# Patient Record
Sex: MALE | Race: BLACK OR AFRICAN AMERICAN | NOT HISPANIC OR LATINO | Employment: OTHER | URBAN - METROPOLITAN AREA
[De-identification: names, ages, dates, MRNs, and addresses within clinical notes are randomized per-mention and may not be internally consistent; named-entity substitution may affect disease eponyms.]

---

## 2017-03-06 ENCOUNTER — HOSPITAL ENCOUNTER (EMERGENCY)
Facility: HOSPITAL | Age: 47
Discharge: HOME | End: 2017-03-06
Attending: EMERGENCY MEDICINE
Payer: MEDICAID

## 2017-03-06 VITALS
BODY MASS INDEX: 20.73 KG/M2 | RESPIRATION RATE: 17 BRPM | DIASTOLIC BLOOD PRESSURE: 63 MMHG | TEMPERATURE: 98 F | SYSTOLIC BLOOD PRESSURE: 104 MMHG | HEART RATE: 111 BPM | OXYGEN SATURATION: 98 % | HEIGHT: 63 IN | WEIGHT: 117 LBS

## 2017-03-06 DIAGNOSIS — R05.9 COUGH: Primary | ICD-10-CM

## 2017-03-06 DIAGNOSIS — J40 BRONCHITIS: ICD-10-CM

## 2017-03-06 LAB
FLUAV AG SPEC QL IA: NEGATIVE
FLUBV AG SPEC QL IA: NEGATIVE
SPECIMEN SOURCE: NORMAL

## 2017-03-06 PROCEDURE — 87400 INFLUENZA A/B EACH AG IA: CPT

## 2017-03-06 PROCEDURE — 99284 EMERGENCY DEPT VISIT MOD MDM: CPT

## 2017-03-06 PROCEDURE — 25000003 PHARM REV CODE 250: Performed by: NURSE PRACTITIONER

## 2017-03-06 RX ORDER — OXCARBAZEPINE 600 MG/1
150 TABLET, FILM COATED ORAL 2 TIMES DAILY
COMMUNITY

## 2017-03-06 RX ORDER — BENZONATATE 100 MG/1
200 CAPSULE ORAL
Status: COMPLETED | OUTPATIENT
Start: 2017-03-06 | End: 2017-03-06

## 2017-03-06 RX ORDER — GUAIFENESIN/DEXTROMETHORPHAN 100-10MG/5
5 SYRUP ORAL EVERY 4 HOURS PRN
Qty: 120 ML | Refills: 0 | Status: SHIPPED | OUTPATIENT
Start: 2017-03-06 | End: 2017-03-16

## 2017-03-06 RX ORDER — AZITHROMYCIN 250 MG/1
250 TABLET, FILM COATED ORAL DAILY
Qty: 6 TABLET | Refills: 0 | Status: SHIPPED | OUTPATIENT
Start: 2017-03-06 | End: 2017-03-16

## 2017-03-06 RX ORDER — GUAIFENESIN/DEXTROMETHORPHAN 100-10MG/5
5 SYRUP ORAL EVERY 4 HOURS PRN
Qty: 120 ML | Refills: 0
Start: 2017-03-06 | End: 2017-03-06

## 2017-03-06 RX ADMIN — BENZONATATE 200 MG: 100 CAPSULE ORAL at 02:03

## 2017-03-06 NOTE — ED PROVIDER NOTES
Encounter Date: 3/6/2017    SCRIBE #1 NOTE: I, Ermelinda Mott, am scribing for, and in the presence of,  Geronimo Monique MD. I have scribed the following portions of the note - Other sections scribed: HPI/ROS.       History     Chief Complaint   Patient presents with    Cough     Pt. presents with cough that is productive for the past two days. Pt. accompanied by family member. Pt.is ambulatory. Here to r/o influenza.      Review of patient's allergies indicates:   Allergen Reactions    Divalproex     Ensure [food supplemt, lactose-reduced]      HPI Comments: CC: Cough    HPI: This 46 y.o. M who has history of autism, mental retardation, HLD, and bowel obstruction presents to the ED accompanied by a family member for evaluation of acute onset productive cough with associated congestion for 2 days. The pt denies fever, chills, N/V/D, and any other associated symptoms. No alleviating or exacerbating factors reported.     The history is provided by the patient and a relative.     Past Medical History:   Diagnosis Date    Autism     Bowel obstruction     GERD (gastroesophageal reflux disease)     Hyperlipidemia     Impulse disorder     Mental retardation      Past Surgical History:   Procedure Laterality Date    ABDOMINAL SURGERY      LIVER BIOPSY       History reviewed. No pertinent family history.  Social History   Substance Use Topics    Smoking status: Never Smoker    Smokeless tobacco: None    Alcohol use No     Review of Systems   Constitutional: Negative for chills and fever.   HENT: Positive for congestion. Negative for sore throat.    Eyes: Negative for visual disturbance.   Respiratory: Positive for cough (productive).    Cardiovascular: Negative for chest pain.   Gastrointestinal: Negative for abdominal pain, diarrhea, nausea and vomiting.   Genitourinary: Negative for dysuria and frequency.   Musculoskeletal: Negative for myalgias.   Skin: Negative for rash.   Neurological: Negative for  headaches.       Physical Exam   Initial Vitals   BP Pulse Resp Temp SpO2   03/06/17 1308 03/06/17 1311 03/06/17 1308 03/06/17 1308 03/06/17 1311   107/77 97 17 97.5 °F (36.4 °C) 98 %     Physical Exam    Vitals reviewed.  Constitutional: He appears well-developed and well-nourished.   HENT:   Head: Normocephalic and atraumatic.   Right Ear: Tympanic membrane normal.   Left Ear: Tympanic membrane normal.   Nose: Mucosal edema and rhinorrhea present.   Mouth/Throat: Oropharynx is clear and moist. No oropharyngeal exudate, posterior oropharyngeal edema, posterior oropharyngeal erythema or tonsillar abscesses.   Eyes: EOM are normal. Pupils are equal, round, and reactive to light.   Neck: Normal range of motion. Neck supple.   Cardiovascular: Normal rate, regular rhythm, normal heart sounds and intact distal pulses.   Pulmonary/Chest: Effort normal and breath sounds normal. No accessory muscle usage. No tachypnea. No respiratory distress. He has no decreased breath sounds. He has no wheezes. He has no rhonchi. He has no rales.   Abdominal: Soft. Bowel sounds are normal.   Musculoskeletal: Normal range of motion.   Neurological: He is alert and oriented to person, place, and time.   Skin: Skin is warm and dry.   Psychiatric: He has a normal mood and affect.         ED Course   Procedures  Labs Reviewed   INFLUENZA A AND B ANTIGEN          X-Rays:   Independently Interpreted Readings:   Chest X-Ray: Normal heart size.  No infiltrates.  No acute abnormalities.       Medical decision-making:    The patient received a medical screening exam. If performed, the EKG was independently evaluated by me and is pending final cardiology evaluation.  If performed, all radiographic studies were independently evaluated by me and are pending final radiology evaluation. If labs were ordered, they were reviewed. Vital signs are independently assessed by me.  If performed, the pulse oximetry was independently evaluated by me.  I decided to  obtain the patient's past medical record.  If available, I reviewed the patient's past medical record, including most recent labs and radiology reports.      The patient appears to have an upper respiratory infection/bronchitis.  Based upon the history and physical exam the patient does not appear to have a serious bacterial infection such as pneumonia, sepsis, otitis media, bacterial sinusitis, strep pharyngitis, parapharyngeal or peritonsillar abscess, meningitis.  Patient appears very well and I have given specific return precautions to the patient and/or family members.  The patient can take over the counter medications and use albuterol for any SOB. Will cover with Azithromycin for possible bacterial etiology.   The results and physical exam findings were reviewed with the patient. Pt agrees with assessment, disposition and treatment plan and has no further questions or complaints at this time.    Follow up with your primary care physician.    JOSE Monique M.D. 4:26 PM 3/6/2017                  Scribe Attestation:   Scribe #1: I performed the above scribed service and the documentation accurately describes the services I performed. I attest to the accuracy of the note.    Attending Attestation:           Physician Attestation for Scribe:  Physician Attestation Statement for Scribe #1: I, Geronimo Monique MD, reviewed documentation, as scribed by Ermelinda Mott in my presence, and it is both accurate and complete.                 ED Course     Clinical Impression:   The primary encounter diagnosis was Cough. A diagnosis of Bronchitis was also pertinent to this visit.          Geronimo Monique MD  03/06/17 4539

## 2017-03-06 NOTE — ED TRIAGE NOTES
Pt arrives to ED via personal transportation with caretaker Tarrence with c/o productive cough with clear sputum and congestion x couple days. Denies chest pain, n/v/d or any other symptoms at this time.

## 2017-03-06 NOTE — ED AVS SNAPSHOT
[facility]  [address] [place] [address]               Rian Patel   3/6/2017  2:25 PM   ED    Description:  Male : 1970   Department:  [facility]           Your Care was Coordinated By:     Provider Role From To    Geronimo Monique MD Attending Provider 17 1540 --    Sindhu Gillis NP Nurse Practitioner 17 1427 17 1536      Reason for Visit     Cough           Diagnoses this Visit        Comments    Cough    -  Primary     Bronchitis           ED Disposition     ED Disposition Condition Comment    Discharge             To Do List           Follow-up Information     Follow up with [facility].    Specialty:  Emergency Medicine    Why:  As needed, If symptoms worsen    Contact information:    [address] [id number] [phone]        Follow up with Hamilton Avila MD. Call in 1 day.    Specialty:  Family Medicine    Why:  for appointment    Contact information:    [address] [facility] [id number] [phone]          Follow up with Hamilton Avila MD In 1 week(s).    Specialty:  Family Medicine    Contact information:    [address] [facility] [address]  [phone]         These Medications        Disp Refills Start End    dextromethorphan-guaifenesin  mg/5 ml (ROBITUSSIN-DM)  mg/5 mL liquid 120 mL 0 3/6/2017 3/16/2017    Take 5 mLs by mouth every 4 (four) hours as needed. - Oral    azithromycin (ZITHROMAX Z-HENRIQUE) 250 MG tablet 6 tablet 0 3/6/2017 3/16/2017    Take 1 tablet (250 mg total) by mouth once daily. Take 500 mg initially then 250 mg daily. - Oral    albuterol sulfate 90 mcg/actuation AePB 1 each 1 3/6/2017     Inhale 1 puff into the lungs every 4 (four) hours as needed. Rescue - Inhalation      [facility] On Call Nurse Care Line -  Assistance  Registered nurses in the  [facility] provide clinical advisement, health education, appointment booking, and other advisory services.  Call for this free service at [phone].             Medications           Message regarding Medications     Verify the changes and/or additions to your medication regime listed below are the same as discussed with your clinician today.  If any of these changes or additions are incorrect, please notify your healthcare provider.        START taking these NEW medications        Refills    dextromethorphan-guaifenesin  mg/5 ml (ROBITUSSIN-DM)  mg/5 mL liquid 0    Sig: Take 5 mLs by mouth every 4 (four) hours as needed.    Class: No Print    Route: Oral    azithromycin (ZITHROMAX Z-HENRIQUE) 250 MG tablet 0    Sig: Take 1 tablet (250 mg total) by mouth once daily. Take 500 mg initially then 250 mg daily.    Class: Print    Route: Oral    albuterol sulfate 90 mcg/actuation AePB 1    Sig: Inhale 1 puff into the lungs every 4 (four) hours as needed. Rescue    Class: Print    Route: Inhalation      These medications were administered today        Dose Freq    benzonatate capsule 200 mg 200 mg ED 1 Time    Sig: Take 2 capsules (200 mg total) by mouth ED 1 Time.    Class: Normal    Route: Oral           Verify that the below list of medications is an accurate representation of the medications you are currently taking.  If none reported, the list may be blank. If incorrect, please contact your healthcare provider. Carry this list with you in case of emergency.           Current Medications     aripiprazole (ABILIFY) 10 MG Tab Take 30 mg by mouth.     oxcarbazepine (TRILEPTAL) 150 MG Tab Take 1 tablet (150 mg total) by mouth 2 (two) times daily.    oxcarbazepine (TRILEPTAL) 600 MG Tab Take 150 mg by mouth 2 (two) times daily.    quetiapine 200 mg oral tb24 (SEROQUEL XR) 200 MG Tb24 Take 1 tablet (200 mg total) by mouth once daily.    albuterol sulfate 90 mcg/actuation AePB Inhale 1 puff into the  "lungs every 4 (four) hours as needed. Rescue    atorvastatin (LIPITOR) 20 MG tablet ONE BY MOUTH DAILY    azithromycin (ZITHROMAX Z-HENRIQUE) 250 MG tablet Take 1 tablet (250 mg total) by mouth once daily. Take 500 mg initially then 250 mg daily.    dextromethorphan-guaifenesin  mg/5 ml (ROBITUSSIN-DM)  mg/5 mL liquid Take 5 mLs by mouth every 4 (four) hours as needed.    docusate sodium (COLACE) 100 MG capsule Take 1 capsule (100 mg total) by mouth 2 (two) times daily.    gabapentin (NEURONTIN) 300 MG capsule Take 150 mg by mouth 3 (three) times daily.    hydrOXYzine (ATARAX) 50 MG tablet Take 50 mg by mouth 2 (two) times daily.    mirtazapine (REMERON) 30 MG tablet Take 30 mg by mouth every evening.    PRILOSEC 20 mg capsule ONE BY MOUTH EVERY MORNING    spironolactone (ALDACTONE) 25 MG tablet Take 12.5 mg by mouth once daily.           Clinical Reference Information           Your Vitals Were     BP Pulse Temp Resp Height Weight    107/77 (BP Location: Right arm, Patient Position: Sitting) 97 97.5 °F (36.4 °C) (Oral) 17 5' 3" (1.6 m) 53.1 kg (117 lb)    SpO2 BMI             98% 20.73 kg/m2         Allergies as of 3/6/2017        Reactions    Divalproex     Ensure [Food Supplemt, Lactose-reduced]       Immunizations Administered on Date of Encounter - 3/6/2017     None      ED Micro, Lab, POCT     Start Ordered       Status Ordering Provider    03/06/17 1433 03/06/17 1433  Influenza antigen Nasal Swab  STAT      Final result       ED Imaging Orders     Start Ordered       Status Ordering Provider    03/06/17 1434 03/06/17 1433  X-Ray Chest PA And Lateral  1 time imaging      Final result         Discharge Instructions           Acute Bronchitis  Your healthcare provider has told you that you have acute bronchitis. Bronchitis is infection or inflammation of the bronchial tubes (airways in the lungs). Normally, air moves easily in and out of the airways. Bronchitis narrows the airways, making it harder for " air to flow in and out of the lungs. This causes symptoms such as shortness of breath, coughing, and wheezing. Bronchitis can be acute or chronic. Acute means the condition comes on quickly and goes away in a short time. Chronic means a condition lasts a long time and often comes back. Read on to learn more about acute bronchitis.    What causes acute bronchitis?  Acute bronchitis almost always starts as a viral respiratory infection, such as a cold or the flu. Certain factors make it more likely for a cold or flu to turn into bronchitis. These include being very young or very old or having a heart or lung problem. Cigarette smoking also makes bronchitis more likely.  When bronchitis develops, the airways become swollen. The airways may also become infected with bacteria. This is known as a secondary infection.  Diagnosing acute bronchitis  Your healthcare provider will examine you and ask about your symptoms and health history. You may also have a sputum culture to test the fluid in your lungs. Chest X-rays may be done to look for infection in the lungs.  Treating acute bronchitis  Bronchitis usually clears up as the cold or flu goes away. You can help feel better faster by doing the following:  · Take medicine as directed. You may be told to take ibuprofen or other over-the-counter medicines. These help relieve inflammation in your bronchial tubes. Your doctor may prescribe an inhaler to help open up the bronchial tubes. Most of the time, acute bronchitis is caused by a viral infection. Antibiotics are usually not prescribed for viral infections.  · Drink plenty of fluids, such as water, juice, or warm soup. Fluids loosen mucus so that you can cough it up. This helps you breathe more easily. Fluids also prevent dehydration.  · Make sure you get plenty of rest.  · Do not smoke. Do not allow anyone else to smoke in your home.  Recovery and follow-up  Follow up with your doctor as you are told. You will likely  feel better in a week or two. But a dry cough can linger beyond that time. Let your doctor know if you still have symptoms (other than a dry cough) after 2 weeks. If youre prone to getting bronchial infections, let your doctor know. And take steps to protect yourself from future infections. These steps include stopping smoking and avoiding tobacco smoke, washing your hands often, and getting a yearly flu shot.  When to call the doctor  Call the doctor if you have any of the following:  · Fever of 100.4°F (38.0°C) higher  · Symptoms that get worse, or new symptoms  · Trouble breathing  · Symptoms that dont start to improve within a week, or within 3 days of taking antibiotics   Date Last Reviewed: 8/4/2014 © 2000-2016 [employer]. [address] [place] [address], [place] [address]. All rights reserved. This information is not intended as a substitute for professional medical care. Always follow your healthcare professional's instructions.          Bronchitis, Antibiotic Treatment (Adult)    Bronchitis is an infection of the air passages (bronchial tubes) in your lungs. It often occurs when you have a cold. This illness is contagious during the first few days and is spread through the air by coughing and sneezing, or by direct contact (touching the sick person and then touching your own eyes, nose, or mouth).  Symptoms of bronchitis include cough with mucus (phlegm) and low-grade fever. Bronchitis usually lasts 7 to 14 days. Mild cases can be treated with simple home remedies. More severe infection is treated with an antibiotic.  Home care  Follow these guidelines when caring for yourself at home:  · If your symptoms are severe, rest at home for the first 2 to 3 days. When you go back to your usual activities, don't let yourself get too tired.  · Do not smoke. Also avoid being exposed to secondhand smoke.  · You may use over-the-counter medicines to control fever or pain, unless another medicine was  prescribed. (Note: If you have chronic liver or kidney disease or have ever had a stomach ulcer or gastrointestinal bleeding, talk with your healthcare provider before using these medicines. Also talk to your provider if you are taking medicine to prevent blood clots.) Aspirin should never be given to anyone younger than 18 years of age who is ill with a viral infection or fever. It may cause severe liver or brain damage.  · Your appetite may be poor, so a light diet is fine. Avoid dehydration by drinking 6 to 8 glasses of fluids per day (such as water, soft drinks, sports drinks, juices, tea, or soup). Extra fluids will help loosen secretions in the nose and lungs.  · Over-the-counter cough, cold, and sore-throat medicines will not shorten the length of the illness, but they may be helpful to reduce symptoms. (Note: Do not use decongestants if you have high blood pressure.)  · Finish all antibiotic medicine. Do this even if you are feeling better after only a few days.  Follow-up care  Follow up with your healthcare provider, or as advised. If you had an X-ray or ECG (electrocardiogram), a specialist will review it. You will be notified of any new findings that may affect your care.  Note: If you are age 65 or older, or if you have a chronic lung disease or condition that affects your immune system, or you smoke, talk to your healthcare provider about having pneumococcal vaccinations and a yearly influenza vaccination (flu shot).  When to seek medical advice  Call your healthcare provider right away if any of these occur:  · Fever of 100.4°F (38°C) or higher  · Coughing up increased amounts of colored sputum  · Weakness, drowsiness, headache, facial pain, ear pain, or a stiff neck  Call 911, or get immediate medical care  Contact emergency services right away if any of these occur.  · Coughing up blood  · Worsening weakness, drowsiness, headache, or stiff neck  · Trouble breathing, wheezing, or pain with  breathing  Date Last Reviewed: 9/13/2015  © 8773-5636 [employer], [employer]. [address] [place] [address], [place] [address]. All rights reserved. This information is not intended as a substitute for professional medical care. Always follow your healthcare professional's instructions.          [facility] Sign-Up     Activating your [facility] account is as easy as 1-2-3!     1) Visit [email or web address], select Sign Up Now, enter this activation code and your date of birth, then select Next.  [email or web address]  Expires: 4/20/2017  3:53 PM      2) Create a username and password to use when you visit [facility] in the future and select a security question in case you lose your password and select Next.    3) Enter your e-mail address and click Sign Up!    Additional Information  If you have questions, please e-mail [email or web address] or call [phone] to talk to our [facility] staff. Remember, [facility] is NOT to be used for urgent needs. For medical emergencies, dial 911.          [facility] complies with applicable Federal civil rights laws and does not discriminate on the basis of race, color, national origin, age, disability, or sex.        Language Assistance Services     ATTENTION: Language assistance services are available, free of charge. Please call [phone].      ATENCIÓN: Si habla español, tiene a jacobo disposición servicios gratuitos de asistencia lingüística. Llame al [phone].     CHÚ Ý: N?u b?n nói Ti?ng Vi?t, có các d?ch v? h? tr? ngôn ng? mi?n phí dành cho b?n. G?i s? [phone].

## 2017-03-06 NOTE — DISCHARGE INSTRUCTIONS
Acute Bronchitis  Your healthcare provider has told you that you have acute bronchitis. Bronchitis is infection or inflammation of the bronchial tubes (airways in the lungs). Normally, air moves easily in and out of the airways. Bronchitis narrows the airways, making it harder for air to flow in and out of the lungs. This causes symptoms such as shortness of breath, coughing, and wheezing. Bronchitis can be acute or chronic. Acute means the condition comes on quickly and goes away in a short time. Chronic means a condition lasts a long time and often comes back. Read on to learn more about acute bronchitis.    What causes acute bronchitis?  Acute bronchitis almost always starts as a viral respiratory infection, such as a cold or the flu. Certain factors make it more likely for a cold or flu to turn into bronchitis. These include being very young or very old or having a heart or lung problem. Cigarette smoking also makes bronchitis more likely.  When bronchitis develops, the airways become swollen. The airways may also become infected with bacteria. This is known as a secondary infection.  Diagnosing acute bronchitis  Your healthcare provider will examine you and ask about your symptoms and health history. You may also have a sputum culture to test the fluid in your lungs. Chest X-rays may be done to look for infection in the lungs.  Treating acute bronchitis  Bronchitis usually clears up as the cold or flu goes away. You can help feel better faster by doing the following:  · Take medicine as directed. You may be told to take ibuprofen or other over-the-counter medicines. These help relieve inflammation in your bronchial tubes. Your doctor may prescribe an inhaler to help open up the bronchial tubes. Most of the time, acute bronchitis is caused by a viral infection. Antibiotics are usually not prescribed for viral infections.  · Drink plenty of fluids, such as water, juice, or warm soup. Fluids loosen mucus  so that you can cough it up. This helps you breathe more easily. Fluids also prevent dehydration.  · Make sure you get plenty of rest.  · Do not smoke. Do not allow anyone else to smoke in your home.  Recovery and follow-up  Follow up with your doctor as you are told. You will likely feel better in a week or two. But a dry cough can linger beyond that time. Let your doctor know if you still have symptoms (other than a dry cough) after 2 weeks. If youre prone to getting bronchial infections, let your doctor know. And take steps to protect yourself from future infections. These steps include stopping smoking and avoiding tobacco smoke, washing your hands often, and getting a yearly flu shot.  When to call the doctor  Call the doctor if you have any of the following:  · Fever of 100.4°F (38.0°C) higher  · Symptoms that get worse, or new symptoms  · Trouble breathing  · Symptoms that dont start to improve within a week, or within 3 days of taking antibiotics   Date Last Reviewed: 8/4/2014  © 0476-1192 [employer]. [address] [place] [address]. All rights reserved. This information is not intended as a substitute for professional medical care. Always follow your healthcare professional's instructions.          Bronchitis, Antibiotic Treatment (Adult)    Bronchitis is an infection of the air passages (bronchial tubes) in your lungs. It often occurs when you have a cold. This illness is contagious during the first few days and is spread through the air by coughing and sneezing, or by direct contact (touching the sick person and then touching your own eyes, nose, or mouth).  Symptoms of bronchitis include cough with mucus (phlegm) and low-grade fever. Bronchitis usually lasts 7 to 14 days. Mild cases can be treated with simple home remedies. More severe infection is treated with an antibiotic.  Home care  Follow these guidelines when caring for yourself at home:  · If your symptoms are severe,  rest at home for the first 2 to 3 days. When you go back to your usual activities, don't let yourself get too tired.  · Do not smoke. Also avoid being exposed to secondhand smoke.  · You may use over-the-counter medicines to control fever or pain, unless another medicine was prescribed. (Note: If you have chronic liver or kidney disease or have ever had a stomach ulcer or gastrointestinal bleeding, talk with your healthcare provider before using these medicines. Also talk to your provider if you are taking medicine to prevent blood clots.) Aspirin should never be given to anyone younger than 18 years of age who is ill with a viral infection or fever. It may cause severe liver or brain damage.  · Your appetite may be poor, so a light diet is fine. Avoid dehydration by drinking 6 to 8 glasses of fluids per day (such as water, soft drinks, sports drinks, juices, tea, or soup). Extra fluids will help loosen secretions in the nose and lungs.  · Over-the-counter cough, cold, and sore-throat medicines will not shorten the length of the illness, but they may be helpful to reduce symptoms. (Note: Do not use decongestants if you have high blood pressure.)  · Finish all antibiotic medicine. Do this even if you are feeling better after only a few days.  Follow-up care  Follow up with your healthcare provider, or as advised. If you had an X-ray or ECG (electrocardiogram), a specialist will review it. You will be notified of any new findings that may affect your care.  Note: If you are age 65 or older, or if you have a chronic lung disease or condition that affects your immune system, or you smoke, talk to your healthcare provider about having pneumococcal vaccinations and a yearly influenza vaccination (flu shot).  When to seek medical advice  Call your healthcare provider right away if any of these occur:  · Fever of 100.4°F (38°C) or higher  · Coughing up increased amounts of colored sputum  · Weakness, drowsiness, headache,  facial pain, ear pain, or a stiff neck  Call 911, or get immediate medical care  Contact emergency services right away if any of these occur.  · Coughing up blood  · Worsening weakness, drowsiness, headache, or stiff neck  · Trouble breathing, wheezing, or pain with breathing  Date Last Reviewed: 9/13/2015  © 2450-4353 [employer]. [address] [place] [id number]. All rights reserved. This information is not intended as a substitute for professional medical care. Always follow your healthcare professional's instructions.

## 2018-05-25 VITALS
TEMPERATURE: 99 F | HEART RATE: 110 BPM | HEIGHT: 65 IN | BODY MASS INDEX: 28.32 KG/M2 | SYSTOLIC BLOOD PRESSURE: 139 MMHG | DIASTOLIC BLOOD PRESSURE: 84 MMHG | OXYGEN SATURATION: 97 % | WEIGHT: 170 LBS | RESPIRATION RATE: 16 BRPM

## 2018-05-25 PROCEDURE — 99283 EMERGENCY DEPT VISIT LOW MDM: CPT

## 2018-05-26 ENCOUNTER — HOSPITAL ENCOUNTER (EMERGENCY)
Facility: HOSPITAL | Age: 48
Discharge: HOME | End: 2018-05-26
Attending: EMERGENCY MEDICINE | Admitting: EMERGENCY MEDICINE
Payer: MEDICAID

## 2018-05-26 DIAGNOSIS — M79.604 RIGHT LEG PAIN: ICD-10-CM

## 2018-05-26 DIAGNOSIS — S80.811A ABRASION OF RIGHT LOWER LEG, INITIAL ENCOUNTER: Primary | ICD-10-CM

## 2018-05-26 RX ORDER — SULFAMETHOXAZOLE AND TRIMETHOPRIM 800; 160 MG/1; MG/1
1 TABLET ORAL 2 TIMES DAILY
Qty: 14 TABLET | Refills: 0 | Status: SHIPPED | OUTPATIENT
Start: 2018-05-26 | End: 2018-06-02

## 2018-05-26 RX ORDER — MUPIROCIN 20 MG/G
OINTMENT TOPICAL 2 TIMES DAILY
Qty: 30 G | Refills: 0 | Status: SHIPPED | OUTPATIENT
Start: 2018-05-26 | End: 2018-06-05

## 2018-05-26 NOTE — ED PROVIDER NOTES
Encounter Date: 5/25/2018  47-year-old male with ulcerating lesions and swelling to the posterior right leg.  No induration, drainage, or bullous lesions. History of MRkvng historian.  Alert, nontoxic, baseline mental status per home aids.  Geraldo LINTON, 11:19 p.m.  SCRIBE #1 NOTE: I, Jose Mcclain, am scribing for, and in the presence of,  SHAILA Guevara. I have scribed the following portions of the note - Other sections scribed: HPI and ROS.       History     Chief Complaint   Patient presents with    wounds right posterior leg     Care taker is with this patient from [facility] with complaints of right lower posterior leg with two quarter size wounds,and feet are edematous. Hx mental retardation,Autism,Mood disorders.Patientis yelling out loud,and bounceing up and down in the chair     CC: Wound    HPI: 47 y.o. male with history of GERD, HLD, mental retardation, and autism presents to ED accompanied by his CNA for evaluation of ulcerating lesions to the right posterior lower leg.The CNA detected the lesions yesterday. Patient initially presented to urgent care but was advised to present to the ED for antibiotics. They suspected the patient may have a possible staph infection. The patient may have been scratching the area. No fever reported. The patient appears agitated.       The history is provided by the patient. No  was used.     Review of patient's allergies indicates:   Allergen Reactions    Divalproex     Ensure [food supplemt, lactose-reduced]      Past Medical History:   Diagnosis Date    Autism     Bowel obstruction     GERD (gastroesophageal reflux disease)     Hyperlipidemia     Impulse disorder     Mental retardation      Past Surgical History:   Procedure Laterality Date    ABDOMINAL SURGERY      LIVER BIOPSY       No family history on file.  Social History   Substance Use Topics    Smoking status: Never Smoker    Smokeless tobacco: Not on file     Alcohol use No     Review of Systems   Unable to perform ROS: Other       Physical Exam     Initial Vitals [05/25/18 2325]   BP Pulse Resp Temp SpO2   139/84 110 16 98.7 °F (37.1 °C) 97 %      MAP       102.33         Physical Exam    Constitutional: He appears well-developed and well-nourished.   Eyes: Conjunctivae and EOM are normal. Pupils are equal, round, and reactive to light.   Neck: Normal range of motion. Neck supple.   Cardiovascular: Normal rate.   Pulmonary/Chest: Breath sounds normal.   Neurological: He is alert and oriented to person, place, and time.   Skin: Skin is warm.   2 small 2 cm circular lesions to right calf with minimal surrounding erythema.  No drainage noted.  Positive tenderness to palpation.  Distal pulses 3+   Psychiatric: He has a normal mood and affect.         ED Course   Procedures  Labs Reviewed   CBC W/ AUTO DIFFERENTIAL   COMPREHENSIVE METABOLIC PANEL             Medical Decision Making:   Initial Assessment:   Patient has 2 small circular abrasions to right calf with minimal surrounding erythema.  No drainage noted.  Patient is afebrile and nontoxic-appearing.  Bite is a stable.  I do not suspect cellulitis at this time.  I will discharge patient with Bactrim and Bactroban ointment.  Caretaker instructed to return patient to the emergency department if symptoms worsen.  Patient is stable for discharge.            Scribe Attestation:   Scribe #1: I performed the above scribed service and the documentation accurately describes the services I performed. I attest to the accuracy of the note.    Attending Attestation:           Physician Attestation for Scribe:  Physician Attestation Statement for Scribe #1: I, SHAILA Guevara, reviewed documentation, as scribed by Jose Mcclain in my presence, and it is both accurate and complete.                    Clinical Impression:   The primary encounter diagnosis was Abrasion of right lower leg, initial encounter. A diagnosis of Right leg  pain was also pertinent to this visit.    Disposition:   Disposition: Discharged  Condition: Stable                        SHAILA Guevara  05/26/18 0614

## 2019-03-18 ENCOUNTER — HOSPITAL ENCOUNTER (EMERGENCY)
Facility: HOSPITAL | Age: 49
Discharge: HOME | End: 2019-03-18
Attending: EMERGENCY MEDICINE
Payer: MEDICAID

## 2019-03-18 VITALS
OXYGEN SATURATION: 99 % | HEART RATE: 103 BPM | RESPIRATION RATE: 18 BRPM | DIASTOLIC BLOOD PRESSURE: 97 MMHG | SYSTOLIC BLOOD PRESSURE: 156 MMHG | TEMPERATURE: 98 F

## 2019-03-18 DIAGNOSIS — R22.0 FACIAL SWELLING: Primary | ICD-10-CM

## 2019-03-18 PROCEDURE — 99283 EMERGENCY DEPT VISIT LOW MDM: CPT

## 2019-03-18 PROCEDURE — 25000003 PHARM REV CODE 250: Performed by: PHYSICIAN ASSISTANT

## 2019-03-18 RX ORDER — PENICILLIN V POTASSIUM 250 MG/1
500 TABLET, FILM COATED ORAL
Status: COMPLETED | OUTPATIENT
Start: 2019-03-18 | End: 2019-03-18

## 2019-03-18 RX ORDER — PENICILLIN V POTASSIUM 500 MG/1
500 TABLET, FILM COATED ORAL 4 TIMES DAILY
Qty: 40 TABLET | Refills: 0 | Status: SHIPPED | OUTPATIENT
Start: 2019-03-18 | End: 2019-03-28

## 2019-03-18 RX ADMIN — PENICILLIN V POTASIUM 500 MG: 250 TABLET ORAL at 10:03

## 2019-03-18 NOTE — ED TRIAGE NOTES
Patient presents to the ED via personal transportation with caregiver. Patient's caretakers states that she noticed swelling to patient's to lower right face. Patient c/o pain to LRQ. Patient denies drainage from gums, fevers, chills.

## 2019-03-18 NOTE — DISCHARGE INSTRUCTIONS
Take all antibiotics as prescribed. Try to take with meals to limit nausea. Tylenol/Ibuprofen as needed for pain.     Follow-up with dentist. Follow-up with primary care provider. Return to this ED if facial swelling worsens, if he begins with fever, if unable to eat or drink, if unable to open jaw, if any other problems occur.

## 2019-12-23 ENCOUNTER — HOSPITAL ENCOUNTER (EMERGENCY)
Facility: HOSPITAL | Age: 49
Discharge: HOME | End: 2019-12-23
Attending: EMERGENCY MEDICINE
Payer: MEDICAID

## 2019-12-23 VITALS
SYSTOLIC BLOOD PRESSURE: 136 MMHG | RESPIRATION RATE: 22 BRPM | DIASTOLIC BLOOD PRESSURE: 90 MMHG | TEMPERATURE: 98 F | BODY MASS INDEX: 27.32 KG/M2 | WEIGHT: 170 LBS | HEART RATE: 87 BPM | HEIGHT: 66 IN | OXYGEN SATURATION: 99 %

## 2019-12-23 DIAGNOSIS — R11.10 VOMITING: ICD-10-CM

## 2019-12-23 DIAGNOSIS — I10 HYPERTENSION, UNSPECIFIED TYPE: Primary | ICD-10-CM

## 2019-12-23 LAB
ALBUMIN SERPL BCP-MCNC: 4.5 G/DL (ref 3.5–5.2)
ALP SERPL-CCNC: 107 U/L (ref 55–135)
ALT SERPL W/O P-5'-P-CCNC: 18 U/L (ref 10–44)
ANION GAP SERPL CALC-SCNC: 9 MMOL/L (ref 8–16)
AST SERPL-CCNC: 23 U/L (ref 10–40)
BASOPHILS # BLD AUTO: 0.05 K/UL (ref 0–0.2)
BASOPHILS NFR BLD: 1 % (ref 0–1.9)
BILIRUB SERPL-MCNC: 0.5 MG/DL (ref 0.1–1)
BUN SERPL-MCNC: 7 MG/DL (ref 6–20)
CALCIUM SERPL-MCNC: 10 MG/DL (ref 8.7–10.5)
CHLORIDE SERPL-SCNC: 103 MMOL/L (ref 95–110)
CO2 SERPL-SCNC: 29 MMOL/L (ref 23–29)
CREAT SERPL-MCNC: 1.3 MG/DL (ref 0.5–1.4)
DIFFERENTIAL METHOD: NORMAL
EOSINOPHIL # BLD AUTO: 0.2 K/UL (ref 0–0.5)
EOSINOPHIL NFR BLD: 3 % (ref 0–8)
ERYTHROCYTE [DISTWIDTH] IN BLOOD BY AUTOMATED COUNT: 12.8 % (ref 11.5–14.5)
EST. GFR  (AFRICAN AMERICAN): >60 ML/MIN/1.73 M^2
EST. GFR  (NON AFRICAN AMERICAN): >60 ML/MIN/1.73 M^2
GLUCOSE SERPL-MCNC: 98 MG/DL (ref 70–110)
HCT VFR BLD AUTO: 46.8 % (ref 40–54)
HGB BLD-MCNC: 15.3 G/DL (ref 14–18)
IMM GRANULOCYTES # BLD AUTO: 0.01 K/UL (ref 0–0.04)
IMM GRANULOCYTES NFR BLD AUTO: 0.2 % (ref 0–0.5)
LYMPHOCYTES # BLD AUTO: 1.7 K/UL (ref 1–4.8)
LYMPHOCYTES NFR BLD: 32.1 % (ref 18–48)
MCH RBC QN AUTO: 28.8 PG (ref 27–31)
MCHC RBC AUTO-ENTMCNC: 32.7 G/DL (ref 32–36)
MCV RBC AUTO: 88 FL (ref 82–98)
MONOCYTES # BLD AUTO: 0.6 K/UL (ref 0.3–1)
MONOCYTES NFR BLD: 10.6 % (ref 4–15)
NEUTROPHILS # BLD AUTO: 2.8 K/UL (ref 1.8–7.7)
NEUTROPHILS NFR BLD: 53.1 % (ref 38–73)
NRBC BLD-RTO: 0 /100 WBC
PLATELET # BLD AUTO: 275 K/UL (ref 150–350)
PMV BLD AUTO: 10.4 FL (ref 9.2–12.9)
POTASSIUM SERPL-SCNC: 3.7 MMOL/L (ref 3.5–5.1)
PROT SERPL-MCNC: 8.1 G/DL (ref 6–8.4)
RBC # BLD AUTO: 5.31 M/UL (ref 4.6–6.2)
SODIUM SERPL-SCNC: 141 MMOL/L (ref 136–145)
WBC # BLD AUTO: 5.26 K/UL (ref 3.9–12.7)

## 2019-12-23 PROCEDURE — 99284 EMERGENCY DEPT VISIT MOD MDM: CPT | Mod: 25

## 2019-12-23 PROCEDURE — 85025 COMPLETE CBC W/AUTO DIFF WBC: CPT

## 2019-12-23 PROCEDURE — 80053 COMPREHEN METABOLIC PANEL: CPT

## 2019-12-23 PROCEDURE — 36000 PLACE NEEDLE IN VEIN: CPT

## 2019-12-23 RX ORDER — ONDANSETRON 4 MG/1
4 TABLET, ORALLY DISINTEGRATING ORAL EVERY 8 HOURS PRN
Qty: 20 TABLET | Refills: 0 | Status: SHIPPED | OUTPATIENT
Start: 2019-12-23

## 2019-12-23 NOTE — ED PROVIDER NOTES
Encounter Date: 12/23/2019    SCRIBE #1 NOTE: I, Nathan Ba, am scribing for, and in the presence of,  Brenton Mandel MD. I have scribed the following portions of the note - Other sections scribed: HPI/ROS/PE.       History     Chief Complaint   Patient presents with    Hypertension     pt lives home alone with around the clock nursing care. nurses stated pts BP today was 170/120, and was given a clonidine. no history of HTN, pt has no complaints at this time. daughter also states that pt threw up 3x last night    Emesis     This 48 y.o. male with a medical history of Autism and mental retardation presents to the ED accompanied by caregiver for an emergent evaluation of elevated BP. Caregiver reports pt had a PCP appointment today where it was noted that BP was elevated after it was taken several times. Pt was given Clonidine. After he relaxed and calmed down, BP was still elevated. The source of the elevated BP was unknown which is why pt was sent to the ED for a further evaluation. Additionally, caregiver reports when she arrived to work today, she was told by the overnight staff that pt vomited 3x and had a few BMs. The staff member assumed that pt was nauseous. Caregiver is unsure if he had diarrhea. Of note, caregiver reports a mild cough. Caregiver states that pt was asymptomatic last week. Otherwise, no fever, chills, rash, and any other associated symptoms.     The history is provided by a caregiver. No  was used.     Review of patient's allergies indicates:   Allergen Reactions    Divalproex     Ensure [food supplemt, lactose-reduced]      Past Medical History:   Diagnosis Date    Autism     Bowel obstruction     GERD (gastroesophageal reflux disease)     Hyperlipidemia     Impulse disorder     Mental retardation      Past Surgical History:   Procedure Laterality Date    ABDOMINAL SURGERY      LIVER BIOPSY       History reviewed. No pertinent family  history.  Social History     Tobacco Use    Smoking status: Never Smoker    Smokeless tobacco: Never Used   Substance Use Topics    Alcohol use: No    Drug use: No     Review of Systems   Constitutional: Negative.    HENT: Negative.    Eyes: Negative.    Respiratory: Positive for cough.    Cardiovascular: Negative.    Gastrointestinal: Positive for vomiting.   Genitourinary: Negative.    Musculoskeletal: Negative.    Skin: Negative.    Neurological: Negative.        Physical Exam     Initial Vitals [12/23/19 1305]   BP Pulse Resp Temp SpO2   (!) 136/90 87 (!) 22 97.9 °F (36.6 °C) 99 %      MAP       --         Physical Exam    Nursing note and vitals reviewed.  Constitutional: He appears well-developed and well-nourished. He is not diaphoretic. No distress.   HENT:   Head: Atraumatic.   Eyes: EOM are normal.   Opacified cornea of the L eye. 3mm R pupil.    Neck: Normal range of motion. Neck supple.   Cardiovascular: Normal rate and regular rhythm.   Pulmonary/Chest: Breath sounds normal. No respiratory distress.   Abdominal: Soft. Bowel sounds are normal.   Musculoskeletal: Normal range of motion. He exhibits no edema or tenderness.   Neurological: He is alert.   Skin: Skin is warm and dry.   Psychiatric: He has a normal mood and affect.         ED Course   Procedures  Labs Reviewed   CBC W/ AUTO DIFFERENTIAL   COMPREHENSIVE METABOLIC PANEL          Imaging Results          X-Ray Chest AP Portable (Final result)  Result time 12/23/19 14:00:36    Final result by Aaron Johnson MD (12/23/19 14:00:36)                 Impression:      No acute cardiopulmonary disease      Electronically signed by: Aaron Johnson MD  Date:    12/23/2019  Time:    14:00             Narrative:    EXAMINATION:  XR CHEST AP PORTABLE    CLINICAL HISTORY:  Vomiting, unspecified    TECHNIQUE:  Single frontal view of the chest was performed.    COMPARISON:  03/06/2017    FINDINGS:  Heart size and pulmonary vessels are normal.  Lungs  are expanded and clear.  No pleural fluid.  Skeletal structures are intact; old rib fracture is seen on the right.  No free air is seen in the upper abdomen.                                            Scribe Attestation:   Scribe #1: I performed the above scribed service and the documentation accurately describes the services I performed. I attest to the accuracy of the note.      MDM:    48 y.o.male with PMHx as noted above, presents with hypertension and vomiting x 1 day. Physical exam remarkable for well appearing male, conversing with ease, abdomen soft, nt/nd, CTAB, no peripherla edema noted.  ED workup remarkable for CBC CMP unremarkable, chest x-ray unremarkable.  Pt presentation consistent with asymptomatic HTN, resolving pta.  At this time given patient's history, physical exam, and ED workup do not suspect CVA/TIA, ICH, HTN emergency, ARF/JERAMY, intoxication, MI/ACS, aortic dissection, or any further malignant cause.  Discussed diagnosis and further treatment with patient and caregiver, including continued BP recordings and BP diary for further intervention with PCP. Return precautions given and all questions answered.  Patient and caregiver in understanding of plan.  Pt discharged to home improved and stable.                    Scribe Attestation: I, Brenton Mandel M.D., personally performed the services described in this documentation. All medical record entries made by the scribe were at my direction and in my presence. I have reviewed the chart and agree that the record reflects my personal performance and is accurate and complete.  Clinical Impression:       ICD-10-CM ICD-9-CM   1. Hypertension, unspecified type I10 401.9   2. Vomiting R11.10 787.03                             Brenton Mandel MD  12/23/19 2156

## 2022-08-26 ENCOUNTER — HOSPITAL ENCOUNTER (EMERGENCY)
Facility: HOSPITAL | Age: 52
Discharge: HOME | End: 2022-08-26
Attending: EMERGENCY MEDICINE
Payer: MEDICAID

## 2022-08-26 VITALS
WEIGHT: 170 LBS | HEART RATE: 113 BPM | OXYGEN SATURATION: 97 % | HEIGHT: 66 IN | TEMPERATURE: 98 F | DIASTOLIC BLOOD PRESSURE: 76 MMHG | RESPIRATION RATE: 18 BRPM | SYSTOLIC BLOOD PRESSURE: 108 MMHG | BODY MASS INDEX: 27.32 KG/M2

## 2022-08-26 DIAGNOSIS — F84.0 AUTISM SPECTRUM DISORDER REQUIRING VERY SUBSTANTIAL SUPPORT (LEVEL 3): Primary | ICD-10-CM

## 2022-08-26 DIAGNOSIS — T14.8XXA ABRASION: ICD-10-CM

## 2022-08-26 PROCEDURE — 25000003 PHARM REV CODE 250: Performed by: EMERGENCY MEDICINE

## 2022-08-26 PROCEDURE — 99283 EMERGENCY DEPT VISIT LOW MDM: CPT

## 2022-08-26 RX ORDER — LEVOTHYROXINE SODIUM 100 UG/1
100 TABLET ORAL
COMMUNITY

## 2022-08-26 RX ORDER — PRAVASTATIN SODIUM 20 MG/1
20 TABLET ORAL DAILY
COMMUNITY

## 2022-08-26 RX ORDER — HALOPERIDOL 5 MG/1
10 TABLET ORAL
Status: COMPLETED | OUTPATIENT
Start: 2022-08-26 | End: 2022-08-26

## 2022-08-26 RX ORDER — HALOPERIDOL 1 MG/1
1 TABLET ORAL 4 TIMES DAILY
COMMUNITY

## 2022-08-26 RX ADMIN — HALOPERIDOL 10 MG: 5 TABLET ORAL at 11:08

## 2022-08-26 NOTE — DISCHARGE INSTRUCTIONS
https://www.autism.org/challenging-behaviors-in-adults-with-autism/      Aggression, destructiveness, and self-injury are common among individuals with ASD. The contributors to aggression, destructiveness, and self-injury can be classified into three categories: a biological basis, a social basis, and an initial biological cause that is later maintained by social consequences. In other instances, nutritional, sensory, or behavioral interventions can be very helpful. Countless individuals on the autism spectrum and their families worldwide are dealing with challenging behaviors. Understanding the causes and pathophysiology of these behaviors and developing effective treatments  is a priority.

## 2022-08-26 NOTE — ED NOTES
Notified [facility] that pt is ready for discharge. They stated they will call caregiver to come get him.

## 2022-08-26 NOTE — ED TRIAGE NOTES
Pt to ED with [facility] and caregiver. [facility] reports pt was at a clinic when he began biting and hitting himself. MD at clinic felt pt was a harm to himself. Caregiver reports this is normal behavior for pt when he has to do something he doesn't want to do. Pt has a hx of MR and autism. Pt easily agitated. Pt consistently asking for food.

## 2022-08-30 NOTE — ED PROVIDER NOTES
Encounter Date: 8/26/2022    ------------------------------------------------------------------------------------------------------------------  I, Harriet Lee, have reviewed the SORT/triage note.      History     Chief Complaint   Patient presents with    Psychiatric Evaluation     Pt to ED via [facility] needing psychiatric evaluation. Caretaker at bedside reports pt is autistic and started biting himself and hitting himself. Pt seems distraught in triage. Denies SI/HI.       HPI: 51 y.o. male PMHx Developmental delay, autism presents with biting himself while he was at his PCP office for evaluation.  PCP wanted patient to be evaluated because he was hitting himself as well.  Caregiver states this an unusual behavior for patient when he is asked to do something he does not want to do. There is no bleeding wound noted.  Patient's only complaint is that he is hungry.   There is no report of LOC, n/v/d/c, f/c, cp, sob, weakness, slurred speech or eye problems.    Rest of history limited by patient desire to have food    Past Medical History:   Diagnosis Date    Autism     Bowel obstruction     GERD (gastroesophageal reflux disease)     Hyperlipidemia     Impulse disorder     Mental retardation      Past Surgical History:   Procedure Laterality Date    ABDOMINAL SURGERY      LIVER BIOPSY       Social History     Tobacco Use    Smoking status: Never    Smokeless tobacco: Never   Substance Use Topics    Alcohol use: No    Drug use: No     No family history on file.  Review of patient's allergies indicates:   Allergen Reactions    Divalproex     Ensure [food supplemt, lactose-reduced]        Current Outpatient Medications:     divalproex sodium (DIVALPROEX ORAL), Take by mouth., Disp: , Rfl:     haloperidoL (HALDOL) 1 MG tablet, Take 1 mg by mouth 4 (four) times daily., Disp: , Rfl:     levothyroxine (SYNTHROID) 100 MCG tablet, Take 100 mcg by mouth before breakfast., Disp: , Rfl:     oxcarbazepine (TRILEPTAL) 600 MG  Tab, Take 150 mg by mouth 2 (two) times daily., Disp: , Rfl:     pravastatin (PRAVACHOL) 20 MG tablet, Take 20 mg by mouth once daily., Disp: , Rfl:     quetiapine 200 mg oral tb24 (SEROQUEL XR) 200 MG Tb24, Take 1 tablet (200 mg total) by mouth once daily. (Patient taking differently: Take 400 mg by mouth once daily.), Disp: 30 tablet, Rfl: 11    albuterol sulfate 90 mcg/actuation AePB, Inhale 1 puff into the lungs every 4 (four) hours as needed. Rescue, Disp: 1 each, Rfl: 1    aripiprazole (ABILIFY) 10 MG Tab, Take 30 mg by mouth. , Disp: , Rfl:     atorvastatin (LIPITOR) 20 MG tablet, ONE BY MOUTH DAILY, Disp: 30 tablet, Rfl: 0    docusate sodium (COLACE) 100 MG capsule, Take 1 capsule (100 mg total) by mouth 2 (two) times daily., Disp: 60 capsule, Rfl: 0    gabapentin (NEURONTIN) 300 MG capsule, Take 150 mg by mouth 3 (three) times daily., Disp: , Rfl:     hydrOXYzine (ATARAX) 50 MG tablet, Take 50 mg by mouth 2 (two) times daily., Disp: , Rfl:     mirtazapine (REMERON) 30 MG tablet, Take 30 mg by mouth every evening., Disp: , Rfl:     ondansetron (ZOFRAN-ODT) 4 MG TbDL, Take 1 tablet (4 mg total) by mouth every 8 (eight) hours as needed., Disp: 20 tablet, Rfl: 0    PRILOSEC 20 mg capsule, ONE BY MOUTH EVERY MORNING, Disp: 30 capsule, Rfl: 2    spironolactone (ALDACTONE) 25 MG tablet, Take 12.5 mg by mouth once daily., Disp: , Rfl:     Review of Systems as per HPI  Review of Systems   Unable to perform ROS: Other (Autism with Developmental Delay)   Constitutional:  Negative for fever.   HENT:  Negative for congestion.    Respiratory:  Negative for cough.    Gastrointestinal:  Negative for abdominal pain, diarrhea, nausea and vomiting.   Genitourinary: Negative.    Skin:  Negative for itching and rash.   Neurological:  Negative for dizziness, tingling, tremors, sensory change, speech change, focal weakness, seizures, loss of consciousness, weakness and headaches.   Endo/Heme/Allergies: Negative.        Physical  "Exam     ED Triage Vitals [08/26/22 1114]   Enc Vitals Group      /79      Pulse (!) 119      Resp 20      Temp 97.4 °F (36.3 °C)      Temp src Oral      SpO2 99 %      Weight 170 lb      Height 5' 6"      Head Circumference       Peak Flow       Pain Score       Pain Loc       Pain Edu?       Excl. in GC?      PHYSICAL EXAMINATION:  Vitals and nursing note reviewed. Tachycardia, afebrile  GENERAL: NAD, alert, well-developed, well-nourished  Physical Exam  Neurological:      General: No focal deficit present.      Mental Status: He is alert and oriented to person, place, and time.      GCS: GCS eye subscore is 4. GCS verbal subscore is 5. GCS motor subscore is 6.      Cranial Nerves: Cranial nerves 2-12 are intact.      Sensory: Sensation is intact.      Motor: Motor function is intact. No weakness, tremor, atrophy, abnormal muscle tone or seizure activity.      Gait: Gait is intact.   Psychiatric:         Attention and Perception: He is inattentive. He does not perceive auditory or visual hallucinations.         Mood and Affect: Mood is not anxious or depressed. Affect is not blunt, angry or tearful.         Behavior: Behavior is hyperactive. Behavior is not agitated, slowed, aggressive, withdrawn or combative. Behavior is cooperative.         Thought Content: Thought content is not paranoid or delusional.       HEENT: Head is normocephalic and atraumatic. Extraocular muscles are intact. Pupils are equal, round, and reactive to light and accommodation. Nares appeared normal. Mouth is without lesions. Mucous membranes are moist. no nystagmus  LUNGS: equal and bilateral chest rise, no obvious wheezing,  no respiratory distress  CV:  no obvious JVD  ABDOMEN:  nondistended.  No guarding  BACK: FROM, neg SLR  EXTREMITIES: MONTENEGRO x 4, FROM, no obvious swelling, no pedal edema  SKIN: warm, dry, intact, no rash/lesions, no pallor      Tests     Labs Reviewed - No data to display  No orders to display       PROCEDURE(S): "  NONE      MEDICAL DECISION MAKING:  History supplemented by old records which were checked/reviewed in EMR: Patient evaluated for epilepsy July 2022.    51 y.o. male PMHx Developmental delay, Autism, Epilepsy presents with increased agitation when arriving at clinic for evaluation.    Consider of conditions include:    Aggression, destructiveness, and self-injury are common among individuals with ASD. The contributors to aggression, destructiveness, and self-injury can be classified into three categories: a biological basis, a social basis, and an initial biological cause that is later maintained by social consequences. In other instances, nutritional, sensory, or behavioral interventions can be very helpful. Countless individuals on the autism spectrum and their families worldwide are dealing with challenging behaviors. Understanding the causes and pathophysiology of these behaviors and developing effective treatments  is a priority.    Unlikely substance abuse, metabolic disturbances,  poisoning, medication side effect, UTI,  dehydration, hyperammonemia, ICH, closed head injury.      Treatment plan includes history, physical exam, cardiac monitoring,  and supportive care.          ED Course   REASSESSMENT: Attempted to position patient in close proximity to the nurses station and leave the door open, attendant to stay with patient as much as possible, speaking calm/quiet voice, active listening, attention to feelings and concerns, redirection/re-orientation, one-on-one verbal communication, decrease stimuli, provide the diversion, review medications, frequent observation and position changes for comfort, and relaxation techniques.  Above alternatives attempted prior to administering medication and restraint.    The results of physical exam findings were reviewed with the patient's caregiver. This discussion included but not exclusive to the risk to the patient due to the potential underlying pathology, the  testing that was required to make the diagnosis, and the treatment administered or prescribed.  Patient given sandwich and immediately becomes more calm. No biting or hitting noted while being observed.    MDM  Reviewed: previous chart, nursing note and vitals    ED Course as of 08/31/22 1034   Fri Aug 26, 2022   1220 RN notified [facility] that pt is ready for discharge. They stated they will call caregiver to come get him. [NO]      ED Course User Index  [NO] Harriet Lee MD       REASSESSMENT: Patient is tolerating p.o. and ambulating with steady gait.   Sx improved after treatment with:   Medications   haloperidoL tablet 10 mg (10 mg Oral Given 8/26/22 1148)        Pt's caregiver agrees with assessment, disposition and treatment plan.  She is amenable to discharge. Precautions for return discussed at length. Further work up and treatment options offered to caregiver who declined. Pt's caregiver informed and understands should immediately return to emergency department with persistent or worsening symptoms or any other concerns.  Discharge and follow-up instructions discussed with the Pt's caregiver who expressed understanding.  A printed After Visit Summary, relating to diagnosis, concerns, and/or associated differentials, was given to the Pt's caregiver. All questions asked and answered to the satisfaction of the Pt's caregiver    Clinical Impression:   Final diagnoses:  [F84.0] Autism spectrum disorder requiring very substantial support (level 3) (Primary)  [T14.8XXA] Abrasion        ED Disposition Condition    Discharge Stable          ED Prescriptions    None       Follow-up Information       Follow up With Specialties Details Why Contact Info    Hamilton Avila MD Family Medicine Call in 2 days As needed, If symptoms worsen [address] [facility] [id number] [phone]               Harriet Lee MD  08/31/22 1883

## 2023-10-31 ENCOUNTER — HOSPITAL ENCOUNTER (EMERGENCY)
Facility: OTHER | Age: 53
Discharge: HOME | End: 2023-10-31
Attending: EMERGENCY MEDICINE
Payer: MEDICAID

## 2023-10-31 VITALS
HEART RATE: 85 BPM | HEIGHT: 63 IN | BODY MASS INDEX: 20.73 KG/M2 | OXYGEN SATURATION: 98 % | SYSTOLIC BLOOD PRESSURE: 129 MMHG | RESPIRATION RATE: 16 BRPM | WEIGHT: 117 LBS | DIASTOLIC BLOOD PRESSURE: 92 MMHG
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
## 2023-10-31 DIAGNOSIS — S09.90XA CLOSED HEAD INJURY, INITIAL ENCOUNTER: Primary | ICD-10-CM

## 2023-10-31 PROCEDURE — 99283 EMERGENCY DEPT VISIT LOW MDM: CPT

## 2023-10-31 RX ORDER — LIDOCAINE HYDROCHLORIDE 10 MG/ML
10 INJECTION INFILTRATION; PERINEURAL
Status: DISCONTINUED | OUTPATIENT
Start: 2023-10-31 | End: 2023-10-31 | Stop reason: HOSPADM

## 2023-10-31 NOTE — ED PROVIDER NOTES
"SCRIBE #1 NOTE: I, Sina Campa, am scribing for, and in the presence of,  Matti Lang DO. I have scribed the following portions of the note - Other sections scribed: HPI, PE.         Source of History:  Patient's caretaker    Chief complaint:  Fall (Witnessed fall earlier this morning after losing balance. -LOC, -blood thinners. Pt lives in assisted living facility with 24/7 total care.  Small knot noted to occiput with small avulsion, no active bleeding. Per caretakers, pt is mentally at baseline. )      HPI:  Rian Patel is a 52 y.o. male presenting after a witnessed ground-level fall in his apartment that occurred about 6 hours ago. He is in an assisted living community with 24/7 total care. The caretaker reports that he slipped and fell, striking his head. He did not lose consciousness and they deny any nausea or vomiting after the event. They are unsure of his last tetanus shot.    Patient denies any other complaints at this time.    This is the extent to the patients complaints today here in the emergency department.    ROS:   See HPI.    Review of patient's allergies indicates:   Allergen Reactions    Divalproex     Ensure [food supplemt, lactose-reduced]        PMH:  As per HPI and below:  Past Medical History:   Diagnosis Date    Autism     Bowel obstruction     GERD (gastroesophageal reflux disease)     Hyperlipidemia     Impulse disorder     Mental retardation      Past Surgical History:   Procedure Laterality Date    ABDOMINAL SURGERY      LIVER BIOPSY         Social History     Tobacco Use    Smoking status: Never    Smokeless tobacco: Never   Substance Use Topics    Alcohol use: No    Drug use: No       Physical Exam:    BP (!) 129/92 (BP Location: Left arm, Patient Position: Sitting)   Pulse 85   Resp 16   Ht 5' 3" (1.6 m)   Wt 53.1 kg (117 lb)   SpO2 98%   BMI 20.73 kg/m²   Nursing note and vital signs reviewed.  Constitutional: No acute distress.  Nontoxic  Head:  Normocephalic " atraumatic  Musculoskeletal: Skin tear about the size of a quarter on occipitis. No breaks, crepitus, or deformity.  Skin: No rashes seen.    Neuro: Per caretaker, patient is at mental baseline. Denies any LOC or vomiting.      MDM/ Differential Dx:   52-year-old male mentally disabled presents for ground level fall.  At mental baseline.  Occurred 6 hours ago.  Has a small skin tear but otherwise no obvious signs of deformity of the skull.  No crepitus palpated.  Based on the symptoms I do not feel imaging is indicated in this patient as I considered but do not suspect any evidence of intracranial hemorrhage or skull fracture..  Reassured the caretaker who has at baseline.  Answered all questions will discharge home.                  Scribe Attestation:   Scribe #1: I performed the above scribed service and the documentation accurately describes the services I performed. I attest to the accuracy of the note.    Physician Attestation for Scribe: I, Dr Matti Lang, reviewed documentation as scribed in my presence, which is both accurate and complete.   Diagnostic Impression:    1. Closed head injury, initial encounter    2. Fall, initial encounter         ED Disposition Condition    Discharge Stable            ED Prescriptions    None       Follow-up Information       Follow up With Specialties Details Why Contact Info    Hamilton Avila MD Family Medicine  As needed [address] [facility] [id number]  [phone]               Matti Lang, DO  10/31/23 8145

## 2023-10-31 NOTE — FIRST PROVIDER EVALUATION
Emergency Department TeleTriage Encounter Note      CHIEF COMPLAINT    Chief Complaint   Patient presents with    Fall     Witnessed fall earlier this morning after losing balance. -LOC, -blood thinners. Pt lives in assisted living facility with 24/7 total care.  Small knot noted to occiput with small avulsion, no active bleeding. Per caretakers, pt is mentally at baseline.        VITAL SIGNS   Initial Vitals [10/31/23 1225]   BP Pulse Resp Temp SpO2   (!) 129/92 85 16 -- 98 %      MAP       --            ALLERGIES    Review of patient's allergies indicates:   Allergen Reactions    Divalproex     Ensure [food supplemt, lactose-reduced]        PROVIDER TRIAGE NOTE  Verbal consent for the teletriage evaluation was given by the patient at the start of the evaluation.  All efforts will be made to maintain patient's privacy during the evaluation.      This is a teletriage evaluation of a 52 y.o. male presenting to the ED with caregiver with c/o fall and hitting back of his head.  Unsure if he lost his balance.  Patient acting appropriately per caregiver.  No LOC. Limited physical exam via telehealth: The patient is awake, alert, answering questions appropriately and is not in respiratory distress.  As the Teletriage provider, I performed an initial assessment and ordered appropriate labs and imaging studies, if any, to facilitate the patient's care once placed in the ED. Once a room is available, care and a full evaluation will be completed by an alternate ED provider.  Any additional orders and the final disposition will be determined by that provider.  All imaging and labs will not be followed-up by the Teletriage Team, including myself.          ORDERS  Labs Reviewed   POCT GLUCOSE MONITORING CONTINUOUS       ED Orders (720h ago, onward)      Start Ordered     Status Ordering Provider    11/01/23 0600 10/31/23 1233  Wound care routine - Clean wound  Daily        Comments: Clean Wound    Ordered BO CAMARENA     11/01/23 0600 10/31/23 1233  Wound care routine - Irrigate wound  Daily        Comments: Irrigate Wound    Ordered BO CAMARENA    11/01/23 0600 10/31/23 1233  Wound care routine - Sterile Gloves to Bedside  Daily        Comments: Provide sterile gloves to bedside    Ordered BO CAMARENA    10/31/23 1333 10/31/23 1233  Tdap (BOOSTRIX) vaccine injection 0.5 mL  vaccine x 1 dose         Ordered BO CAMARENA    10/31/23 1245 10/31/23 1233  LIDOcaine HCL 10 mg/ml (1%) injection 10 mL  ED 1 Time         Ordered BO CAMARENA    10/31/23 1245 10/31/23 1233  neomycin-bacitracnZn-polymyxnB packet  ED 1 Time         Ordered BO CAMARENA    10/31/23 1234 10/31/23 1233  POCT glucose  Once         Ordered BO CAMARENA    10/31/23 1234 10/31/23 1233  Change dressing - apply dry sterile dressing  Once        Comments: Apply dry sterile dressing.    Ordered BO CAMARENA    10/31/23 1234 10/31/23 1233  Provide suture tray to patient bedside  Once         Ordered BO CAMARENA              Virtual Visit Note: The provider triage portion of this emergency department evaluation and documentation was performed via [employer], a HIPAA-compliant telemedicine application, in concert with a tele-presenter in the room. A face to face patient evaluation with one of my colleagues will occur once the patient is placed in an emergency department room.      DISCLAIMER: This note was prepared with [employer] voice recognition transcription software. Garbled syntax, mangled pronouns, and other bizarre constructions may be attributed to that software system.

## 2023-10-31 NOTE — ED TRIAGE NOTES
Family members report that patient has a habit of walking too fast - this morning he was walking to room and lost balance and fell. Present with c/o superficial wound to back of head. Family states patient is otherwise at baseline and appears to have no other injuries. Patient is mentally unable to participate in interview.

## 2024-05-07 ENCOUNTER — HOSPITAL ENCOUNTER (EMERGENCY)
Facility: HOSPITAL | Age: 54
Discharge: HOME | End: 2024-05-07
Attending: EMERGENCY MEDICINE
Payer: MEDICAID

## 2024-05-07 VITALS
TEMPERATURE: 98 F | RESPIRATION RATE: 22 BRPM | HEART RATE: 110 BPM | DIASTOLIC BLOOD PRESSURE: 71 MMHG | OXYGEN SATURATION: 99 % | BODY MASS INDEX: 20.55 KG/M2 | WEIGHT: 116 LBS | SYSTOLIC BLOOD PRESSURE: 115 MMHG

## 2024-05-07 DIAGNOSIS — M79.604 RIGHT LEG PAIN: ICD-10-CM

## 2024-05-07 DIAGNOSIS — K59.00 CONSTIPATION, UNSPECIFIED CONSTIPATION TYPE: Primary | ICD-10-CM

## 2024-05-07 PROCEDURE — 99284 EMERGENCY DEPT VISIT MOD MDM: CPT | Mod: 25,ER

## 2024-05-07 PROCEDURE — 25000003 PHARM REV CODE 250: Mod: ER | Performed by: EMERGENCY MEDICINE

## 2024-05-07 RX ORDER — PSEUDOEPHEDRINE/ACETAMINOPHEN 30MG-500MG
100 TABLET ORAL
Status: COMPLETED | OUTPATIENT
Start: 2024-05-07 | End: 2024-05-07

## 2024-05-07 RX ORDER — LIDOCAINE HYDROCHLORIDE 20 MG/ML
JELLY TOPICAL
Status: COMPLETED | OUTPATIENT
Start: 2024-05-07 | End: 2024-05-07

## 2024-05-07 RX ORDER — SYRING-NEEDL,DISP,INSUL,0.3 ML 29 G X1/2"
296 SYRINGE, EMPTY DISPOSABLE MISCELLANEOUS
Status: COMPLETED | OUTPATIENT
Start: 2024-05-07 | End: 2024-05-07

## 2024-05-07 RX ADMIN — Medication 100 ML: at 02:05

## 2024-05-07 RX ADMIN — MAGNESIUM CITRATE 296 ML: 1.75 LIQUID ORAL at 02:05

## 2024-05-07 RX ADMIN — LIDOCAINE HYDROCHLORIDE 10 ML: 20 JELLY TOPICAL at 02:05

## 2024-05-07 RX ADMIN — SODIUM CHLORIDE 500 ML: 9 INJECTION, SOLUTION INTRAVENOUS at 02:05

## 2024-05-07 NOTE — DISCHARGE INSTRUCTIONS
CT scan does not show fracture or dislocation.  There is note of significant constipation which can refer pain to the leg and decreased desire to straight leg.  Patient has received an enema in the emergency department.  He should continue his medications as he has been prescribed including lactulose to ensure that he is having regular bowel movements.  Schedule follow-up with patient's primary physician.  Return to the emergency department for fever, inability to tolerate food, uncontrollable nausea or vomiting or any new, worsening or significantly concerning symptoms.    Thank you for coming to our Emergency Department today. It is important to remember that some problems are difficult to diagnose and may not be found during your first visit. Be sure to follow up with your primary care doctor and review any labs/imaging that was performed with them. If you do not have a primary care doctor, you may contact the one listed on your discharge paperwork or you may also call the [facility] Appointment Desk at [phone] to schedule an appointment with one.     All medications may potentially have side effects and it is impossible to predict which medications may give you side effects. If you feel that you are having a negative effect of any medication you should immediately stop taking them and seek medical attention.    Return to the ER with any questions/concerns, new/concerning symptoms, worsening or failure to improve. Do not drive or make any important decisions for 24 hours if you have received any pain medications, sedatives or mood altering drugs during your ER visit.

## 2024-05-07 NOTE — ED PROVIDER NOTES
Encounter Date: 5/7/2024    SCRIBE #1 NOTE: I, Adriana Jacobs, am scribing for, and in the presence of,  Masood Monae MD. I have scribed the following portions of the note - Other sections scribed: HPI, ROS, PE.       History     Chief Complaint   Patient presents with    Leg Pain     Pt began with right leg pain over the weekend per caregiver   Denies falling or any injury      This is a 53 y.o. male with a PMHx of autism and developmental disability who presents to the Emergency Department for evaluation of complain of right leg pain with decreased movement of right leg for the past 4 days.. Independent historian, patient's caregiver, states the patient complained of right leg pain and since has remained with the bilateral knees flexed.  She reports he extends the left leg occasionally as usual but has resisted extending the right leg. Caregiver states the patient was able to extend bilateral knees days prior to onset of symptoms. Patient is non-ambulatory at baseline and uses a wheelchair. Caregiver denies any recent injuries or falls. Patient is compliant with all his daily medications. Denies any associated fever, nausea, vomiting, seizure activity, or appetite changes. Patient is allergic to Divalproex and Ensure.         The history is provided by a caregiver. The history is limited by a developmental delay. No  was used.     Review of patient's allergies indicates:   Allergen Reactions    Divalproex     Ensure [food supplemt, lactose-reduced]      Past Medical History:   Diagnosis Date    Autism     Bowel obstruction     GERD (gastroesophageal reflux disease)     Hyperlipidemia     Impulse disorder     Mental retardation      Past Surgical History:   Procedure Laterality Date    ABDOMINAL SURGERY      LIVER BIOPSY       No family history on file.  Social History     Tobacco Use    Smoking status: Never    Smokeless tobacco: Never   Substance Use Topics    Alcohol use: No    Drug use:  No     Review of Systems   Constitutional:  Negative for fever.   HENT:  Negative for facial swelling and sore throat.    Eyes:  Negative for pain and discharge.   Respiratory:  Negative for choking and shortness of breath.    Cardiovascular:  Negative for chest pain.   Gastrointestinal:  Negative for abdominal pain, nausea and vomiting.   Genitourinary:  Negative for dysuria and frequency.   Musculoskeletal:  Positive for arthralgias, gait problem and myalgias. Negative for back pain.   Skin:  Negative for rash.   Neurological:  Negative for seizures, weakness and numbness.       Physical Exam     Initial Vitals   BP Pulse Resp Temp SpO2   05/07/24 1105 05/07/24 1105 05/07/24 1105 05/07/24 1122 05/07/24 1105   106/83 100 20 98.3 °F (36.8 °C) (!) 93 %      MAP       --                Physical Exam    Nursing note and vitals reviewed.  Constitutional: He is not diaphoretic. No distress.   HENT:   Head: Normocephalic and atraumatic.   Protecting airway   Eyes: Conjunctivae and EOM are normal. No scleral icterus.   Neck: Neck supple. No tracheal deviation present.   Normal range of motion.  Cardiovascular:  Normal rate, regular rhythm and intact distal pulses.           Pulmonary/Chest: No stridor. No respiratory distress. He has no wheezes. He has no rhonchi.   Abdominal: Abdomen is soft. He exhibits no distension. There is no abdominal tenderness.   Musculoskeletal:         General: No tenderness or edema.      Cervical back: Normal range of motion and neck supple.      Comments: Right hip held in flexion without tenderness or deformity. No effusion of right knee.      Neurological: No cranial nerve deficit.   Skin: Skin is warm and dry.         ED Course   Procedures  Labs Reviewed - No data to display       Imaging Results              CT Pelvis Without Contrast (Final result)  Result time 05/07/24 14:17:27      Final result by Saurabh Damian MD (05/07/24 14:17:27)                   Impression:      1.  Allowing for motion artifact and positioning, no convincing acute displaced fracture or dislocation of the pelvis.  Please see above for additional findings.      Electronically signed by: Saurabh Damian MD  Date:    05/07/2024  Time:    14:17               Narrative:    EXAMINATION:  CT PELVIS WITHOUT CONTRAST    CLINICAL HISTORY:  Pelvis pain, stress fracture suspected, neg xray;    TECHNIQUE:  Axial images of the pelvis were obtained at 2.5 mm intervals without administration of IV contrast.  Coronal and sagittal reformatted images were reviewed.    COMPARISON:  CT 10/27/2016    FINDINGS:  Please note, positioning and motion artifact limit evaluation of the pelvis.    Allowing for the above, the bilateral sacroiliac joints are intact.  The pubic symphysis is intact noting degenerative changes.  The bilateral sacroiliac joints are intact noting degenerative changes.  The sacral segments are aligned.  There are degenerative changes of the lumbar spine.    There is cholelithiasis.  There is moderate to large amount of stool throughout the colon.  The urinary bladder is distended, correlation with any history of urinary retention or outlet obstruction.  No focal organized subcutaneous fluid collection.                                       X-Ray Knee 3 View Right (Final result)  Result time 05/07/24 12:21:17      Final result by Saurabh Damian MD (05/07/24 12:21:17)                   Impression:      1. On the 2 images provided, no convincing acute displaced fracture or dislocation of the knee, correlation is advised in this limited exam.      Electronically signed by: Saurabh Damian MD  Date:    05/07/2024  Time:    12:21               Narrative:    EXAMINATION:  XR KNEE 3 VIEW RIGHT    CLINICAL HISTORY:  Pain in right leg    TECHNIQUE:  AP, lateral, and Merchant views of the right knee were performed.    COMPARISON:  None    FINDINGS:  Two views right knee, sunrise and lateral.    On the images provided, no  convincing acute displaced fracture or dislocation of the knee.  No large knee joint effusion.  No radiopaque foreign body.                                       X-Ray Hip 2 or 3 views Right (with Pelvis when performed) (Final result)  Result time 05/07/24 12:20:23      Final result by Saurabh Damian MD (05/07/24 12:20:23)                   Impression:      1. Questionable irregularity along the greater trochanter of the right femur noting positioning in artifact limits evaluation.  Clinical correlation is advised with any focal tenderness.  Repeat imaging as warranted.  CT could be performed if there is continued concern for right hip fracture.      Electronically signed by: Saurabh Damian MD  Date:    05/07/2024  Time:    12:20               Narrative:    EXAMINATION:  XR HIP WITH PELVIS WHEN PERFORMED, 2 OR 3  VIEWS RIGHT    CLINICAL HISTORY:  Pain in right leg    TECHNIQUE:  AP view of the pelvis and frog leg lateral view of the right hip were performed.    COMPARISON:  CT abdomen and pelvis 10/27/2016    FINDINGS:  Two views right hip.    Please note, patient positioning in artifact limits evaluation of the proximal right femur.  Allowing for this, the bilateral sacroiliac joints are intact.  The pubic symphysis is intact.  The bilateral femoral heads maintain appropriate relationship with their respective acetabula.  There is some cortical irregularity along the greater trochanter of the right femur.  Correlation with any history of prior injury to the region.  No radiopaque foreign body.                                       Medications   LIDOcaine HCl 2% urojet (10 mLs Mucous Membrane Given 5/7/24 1456)   glycerin 99.5% topical solution 100 mL (100 mLs Rectal Given 5/7/24 1456)     And   magnesium citrate solution 296 mL (296 mLs Rectal Given 5/7/24 1457)     And   sodium chloride 0.9% bolus 500 mL 500 mL (500 mLs Rectal New Bag 5/7/24 1457)     Medical Decision Making  Differential diagnosis include  but are not limited to: Fracture, Dislocation, Synovitis.     Patient is afebrile and in no acute distress at time of history and physical.  He appears to be at his neurological baseline.  He has no leg edema, deformity, effusion of bilateral knees.  There is no gross hip tenderness however patient is reluctant to extend the right leg.  X-ray of the right knee without acute fracture or dislocation.  X-ray of the right hip with questionable irregularity along the greater trochanter.  CT scan does not demonstrate acute displaced fracture or dislocation.  CT scan does note large amount of stool throughout the colon.  My review of CT images does note some colonic distention.  Patient's caretaker reports that he is tolerating p.o. and is having bowel movements.  Rectal exam chaperoned by RN have fecal impaction.  Patient given enema in the emergency department and had bowel movement.  Review of chart demonstrates that patient has been prescribed lactulose and MiraLax for use as needed.  Patient remains clinically stable in the emergency department.  She is fit for discharge on trial of management with bowel regiment, close follow-up with primary physician.  Patient's caretaker at the bedside counseled on supportive care, appropriate medication usage, concerning symptoms for which to return to ER and the importance of follow up. Understanding and agreement with treatment plan was expressed.       Amount and/or Complexity of Data Reviewed  Independent Historian: caregiver     Details: See HPI.  Radiology: ordered. Decision-making details documented in ED Course.    Risk  OTC drugs.  Prescription drug management.            Scribe Attestation:   Scribe #1: I performed the above scribed service and the documentation accurately describes the services I performed. I attest to the accuracy of the note.              This chart was completed using dictation software, as a result there may be some transcription errors.                 I, Masood Monae , personally performed the services described in this documentation. All medical record entries made by the scribe were at my direction and in my presence. I have reviewed the chart and agree that the record reflects my personal performance and is accurate and complete.    Clinical Impression:  Final diagnoses:  [M79.604] Right leg pain  [K59.00] Constipation, unspecified constipation type (Primary)          ED Disposition Condition    Discharge Stable          ED Prescriptions    None       Follow-up Information       Follow up With Specialties Details Why Contact Info    Hamilton Avila MD Family Medicine Schedule an appointment as soon as possible for a visit   [address] [facility] [address]  [phone]               Masood Monae MD  05/07/24 [id number]